# Patient Record
(demographics unavailable — no encounter records)

---

## 2024-12-19 NOTE — HISTORY OF PRESENT ILLNESS
[FreeTextEntry1] : Patient is a 77-year-old white female with prior history of hypertension diabetes who presents with atypical left-sided rib pain pain is pleuritic in nature and worse with chest motion and movement in bed. She has no symptoms of exertional chest pain.  12/20/22 Denies Chest Pain, SOB, Dizziness, Leg edema, Orthopnea, PND, Palpitations, Syncope, ALLEN, Diaphoresis. Patient denies change in appetite, fatigue, heat or cold intolerance, myalgias, polydipsia, polyphagia, polyuria, tingling, numbness HA s drops in BP to 115 sys minimally symptomatic LDL 86 HGAIC 6.3 preop for CF in [facility] 2023 04/20/23 feels well no sob or sscp no issues w meds  08/21/23 remains active no angina  has aodm and HTN  atypical left sided pain non effort based  pain last hours at a time   12/21/23 atypical left sided chest pain no allen  LDL at goal hgaic 6.4   4/11/24 left sided pain and progressive sob  no effort based dyspnea  on off palpaitions  is active walks daily at medium pace   08/15/24 Has nocturnal cramping  BGM elevated  no angina or allen   12/19/24 on meds for BP and aodm progressive right Kne pain w walking had injection no benefit  no angina or allen

## 2024-12-19 NOTE — PHYSICAL EXAM
[Well Developed] : well developed [Well Nourished] : well nourished [No Acute Distress] : no acute distress [Normal Conjunctiva] : normal conjunctiva [Normal Venous Pressure] : normal venous pressure [No Carotid Bruit] : no carotid bruit [Normal S1, S2] : normal S1, S2 [No Murmur] : no murmur [No Rub] : no rub [No Gallop] : no gallop [Clear Lung Fields] : clear lung fields [Good Air Entry] : good air entry [No Respiratory Distress] : no respiratory distress  [Soft] : abdomen soft [Non Tender] : non-tender [No Masses/organomegaly] : no masses/organomegaly [Normal Bowel Sounds] : normal bowel sounds [Normal Gait] : normal gait [No Edema] : no edema [No Cyanosis] : no cyanosis [No Clubbing] : no clubbing [No Varicosities] : no varicosities [No Rash] : no rash [No Skin Lesions] : no skin lesions [Moves all extremities] : moves all extremities [No Focal Deficits] : no focal deficits [Normal Speech] : normal speech [Alert and Oriented] : alert and oriented [Normal memory] : normal memory [de-identified] : pin poimt left rib pain

## 2024-12-19 NOTE — REVIEW OF SYSTEMS
[Feeling Fatigued] : feeling fatigued [Negative] : Genitourinary [Headache] : no headache [Weight Gain (___ Lbs)] : no recent weight gain [Weight Loss (___ Lbs)] : no recent weight loss [Blurry Vision] : no blurred vision [Earache] : no earache [Sore Throat] : no sore throat [SOB] : no shortness of breath [Dyspnea on exertion] : not dyspnea during exertion [Chest Discomfort] : no chest discomfort [Lower Ext Edema] : no extremity edema [Rash] : no rash [Skin Lesions] : no skin lesions [Dizziness] : no dizziness [Convulsions] : no convulsions [Limb Weakness (Paresis)] : no limb weakness (Paresis) [FreeTextEntry9] : leg cramping

## 2024-12-19 NOTE — DISCUSSION/SUMMARY
[Non-Cardiac] : non-cardiac chest pain [Stable] : stable [Patient] : the patient [de-identified] : tylenol prn  [FreeTextEntry1] : Hyperlipidemia: The impression is hyperlipidemia. Currently, this condition is stable. Medication management includes continuing statins.  Hypertension: The impression is hypertension. Currently, the condition is stable. Medication management includes continuing angiotensin receptor blockers and continuing beta blockers. Other planned treatment includes dietary modification, a home monitor, weight loss, low sodium diet and non-steroidal anti-inflammatory drugs avoidance. The plan was discussed with the patient. Lvef 55% MILD AI M R  Chest Pain: The impression is non-cardiac chest pain. There are no changes in medication management.  Hypertension: The impression is hypertension. Currently, the condition is stable. The diagnostic plan includes ECG and echocardiogram.There are no changes in medication management.  .  Diabetes - blood work reviewed with patient. Maintain a low caloric, low sodium, low cholesterol diet. Dietary counseling given, diet and exercise discussed in detail, weight loss recommended.

## 2024-12-19 NOTE — ADDENDUM
[FreeTextEntry1] : Julio Tanner assisted in documentation on 12/19/24 acting as a scribe for Dr. Joe Lord.

## 2025-05-06 NOTE — DISCUSSION/SUMMARY
[Non-Cardiac] : non-cardiac chest pain [Stable] : stable [Patient] : the patient [de-identified] : tylenol prn  [FreeTextEntry1] : Hyperlipidemia: The impression is hyperlipidemia. Currently, this condition is stable. Medication management includes continuing statins.  Hypertension: The impression is hypertension. Currently, the condition is stable. Medication management includes continuing angiotensin receptor blockers and continuing beta blockers. Other planned treatment includes dietary modification, a home monitor, weight loss, low sodium diet and non-steroidal anti-inflammatory drugs avoidance. The plan was discussed with the patient. Lvef 55% MILD AI M R  Chest Pain: The impression is non-cardiac chest pain. There are no changes in medication management.  Hypertension: The impression is hypertension. Currently, the condition is stable. The diagnostic plan includes ECG and echocardiogram.There are no changes in medication management.  .  Diabetes - blood work reviewed with patient. Maintain a low caloric, low sodium, low cholesterol diet. Dietary counseling given, diet and exercise discussed in detail, weight loss recommended.    [EKG obtained to assist in diagnosis and management of assessed problem(s)] : EKG obtained to assist in diagnosis and management of assessed problem(s)

## 2025-05-06 NOTE — HISTORY OF PRESENT ILLNESS
[FreeTextEntry1] : Patient is a 77-year-old white female with prior history of hypertension diabetes who presents with atypical left-sided rib pain pain is pleuritic in nature and worse with chest motion and movement in bed. She has no symptoms of exertional chest pain.  12/20/22 Denies Chest Pain, SOB, Dizziness, Leg edema, Orthopnea, PND, Palpitations, Syncope, ALLEN, Diaphoresis. Patient denies change in appetite, fatigue, heat or cold intolerance, myalgias, polydipsia, polyphagia, polyuria, tingling, numbness HA s drops in BP to 115 sys minimally symptomatic LDL 86 HGAIC 6.3 preop for CF in [facility] 2023 04/20/23 feels well no sob or sscp no issues w meds  08/21/23 remains active no angina  has aodm and HTN  atypical left sided pain non effort based  pain last hours at a time   12/21/23 atypical left sided chest pain no allen  LDL at goal hgaic 6.4   4/11/24 left sided pain and progressive sob  no effort based dyspnea  on off palpaitions  is active walks daily at medium pace   08/15/24 Has nocturnal cramping  BGM elevated  no angina or allen   12/19/24 on meds for BP and aodm progressive right Kne pain w walking had injection no benefit  no angina or allen   5/6/25 has had new onset effort related chest pain lasting 1-2 min no sob  had chronic dizziness postional innature

## 2025-05-06 NOTE — ADDENDUM
[FreeTextEntry1] : Julio Tanner assisted in documentation on 5/6/25 acting as a scribe for Dr. Joe Lord.

## 2025-05-06 NOTE — PHYSICAL EXAM
[Well Developed] : well developed [Well Nourished] : well nourished [No Acute Distress] : no acute distress [Normal Conjunctiva] : normal conjunctiva [Normal Venous Pressure] : normal venous pressure [No Carotid Bruit] : no carotid bruit [Normal S1, S2] : normal S1, S2 [No Murmur] : no murmur [No Rub] : no rub [No Gallop] : no gallop [Clear Lung Fields] : clear lung fields [Good Air Entry] : good air entry [No Respiratory Distress] : no respiratory distress  [Soft] : abdomen soft [Non Tender] : non-tender [No Masses/organomegaly] : no masses/organomegaly [Normal Bowel Sounds] : normal bowel sounds [Normal Gait] : normal gait [No Edema] : no edema [No Cyanosis] : no cyanosis [No Clubbing] : no clubbing [No Varicosities] : no varicosities [No Rash] : no rash [No Skin Lesions] : no skin lesions [Moves all extremities] : moves all extremities [No Focal Deficits] : no focal deficits [Normal Speech] : normal speech [Alert and Oriented] : alert and oriented [Normal memory] : normal memory [de-identified] : pin poimt left rib pain

## 2025-06-10 NOTE — ADDENDUM
[FreeTextEntry1] : Julio Tanner assisted in documentation on 6/10/25 acting as a scribe for Dr. Joe Lord.

## 2025-06-10 NOTE — PHYSICAL EXAM
[Well Developed] : well developed [Well Nourished] : well nourished [No Acute Distress] : no acute distress [Normal Conjunctiva] : normal conjunctiva [Normal Venous Pressure] : normal venous pressure [No Carotid Bruit] : no carotid bruit [Normal S1, S2] : normal S1, S2 [No Murmur] : no murmur [No Rub] : no rub [No Gallop] : no gallop [Clear Lung Fields] : clear lung fields [Good Air Entry] : good air entry [No Respiratory Distress] : no respiratory distress  [Soft] : abdomen soft [Non Tender] : non-tender [No Masses/organomegaly] : no masses/organomegaly [Normal Bowel Sounds] : normal bowel sounds [Normal Gait] : normal gait [No Edema] : no edema [No Cyanosis] : no cyanosis [No Varicosities] : no varicosities [No Clubbing] : no clubbing [No Rash] : no rash [No Skin Lesions] : no skin lesions [Moves all extremities] : moves all extremities [No Focal Deficits] : no focal deficits [Normal Speech] : normal speech [Alert and Oriented] : alert and oriented [Normal memory] : normal memory [de-identified] : pin poimt left rib pain

## 2025-06-10 NOTE — DISCUSSION/SUMMARY
[Non-Cardiac] : non-cardiac chest pain [Stable] : stable [Patient] : the patient [de-identified] : tylenol prn  [FreeTextEntry1] : Hyperlipidemia: The impression is hyperlipidemia. Currently, this condition is stable. Medication management includes continuing statins.  Hypertension: The impression is hypertension. Currently, the condition is stable. Medication management includes continuing angiotensin receptor blockers and continuing beta blockers. Other planned treatment includes dietary modification, a home monitor, weight loss, low sodium diet and non-steroidal anti-inflammatory drugs avoidance. The plan was discussed with the patient. Lvef 55% MILD AI M R  Chest Pain: The impression is non-cardiac chest pain. There are no changes in medication management.  Hypertension: The impression is hypertension. Currently, the condition is stable. The diagnostic plan includes ECG and echocardiogram.There are no changes in medication management.  .  Diabetes - blood work reviewed with patient. Maintain a low caloric, low sodium, low cholesterol diet. Dietary counseling given, diet and exercise discussed in detail, weight loss recommended.

## 2025-06-10 NOTE — HISTORY OF PRESENT ILLNESS
[FreeTextEntry1] : Patient is a 77-year-old white female with prior history of hypertension diabetes who presents with atypical left-sided rib pain pain is pleuritic in nature and worse with chest motion and movement in bed. She has no symptoms of exertional chest pain.  12/20/22 Denies Chest Pain, SOB, Dizziness, Leg edema, Orthopnea, PND, Palpitations, Syncope, ALLEN, Diaphoresis. Patient denies change in appetite, fatigue, heat or cold intolerance, myalgias, polydipsia, polyphagia, polyuria, tingling, numbness HA s drops in BP to 115 sys minimally symptomatic LDL 86 HGAIC 6.3 preop for CF in [facility] 2023 04/20/23 feels well no sob or sscp no issues w meds  08/21/23 remains active no angina  has aodm and HTN  atypical left sided pain non effort based  pain last hours at a time   12/21/23 atypical left sided chest pain no allen  LDL at goal hgaic 6.4   4/11/24 left sided pain and progressive sob  no effort based dyspnea  on off palpaitions  is active walks daily at medium pace   08/15/24 Has nocturnal cramping  BGM elevated  no angina or allen   12/19/24 on meds for BP and aodm progressive right Kne pain w walking had injection no benefit  no angina or allen   5/6/25 has had new onset effort related chest pain lasting 1-2 min no sob  had chronic dizziness postional innature   6/10/25 recent TA plaque non obstructive  ldl 119 on Lipitor 10 mg  has pos Vertigo non responsive to antivert